# Patient Record
Sex: FEMALE | Race: WHITE | NOT HISPANIC OR LATINO | URBAN - METROPOLITAN AREA
[De-identification: names, ages, dates, MRNs, and addresses within clinical notes are randomized per-mention and may not be internally consistent; named-entity substitution may affect disease eponyms.]

---

## 2019-03-05 ENCOUNTER — OFFICE (OUTPATIENT)
Dept: URBAN - METROPOLITAN AREA CLINIC 101 | Facility: CLINIC | Age: 44
End: 2019-03-05

## 2019-03-05 VITALS
HEART RATE: 69 BPM | SYSTOLIC BLOOD PRESSURE: 113 MMHG | DIASTOLIC BLOOD PRESSURE: 72 MMHG | TEMPERATURE: 98.1 F | HEIGHT: 72 IN | WEIGHT: 187 LBS

## 2019-03-05 DIAGNOSIS — F41.9 ANXIETY DISORDER, UNSPECIFIED: ICD-10-CM

## 2019-03-05 DIAGNOSIS — G47.30 SLEEP APNEA, UNSPECIFIED: ICD-10-CM

## 2019-03-05 DIAGNOSIS — R25.2 CRAMP AND SPASM: ICD-10-CM

## 2019-03-05 DIAGNOSIS — R12 HEARTBURN: ICD-10-CM

## 2019-03-05 DIAGNOSIS — R14.2 ERUCTATION: ICD-10-CM

## 2019-03-05 DIAGNOSIS — K21.9 GASTRO-ESOPHAGEAL REFLUX DISEASE WITHOUT ESOPHAGITIS: ICD-10-CM

## 2019-03-05 DIAGNOSIS — R06.89 OTHER ABNORMALITIES OF BREATHING: ICD-10-CM

## 2019-03-05 DIAGNOSIS — R63.4 ABNORMAL WEIGHT LOSS: ICD-10-CM

## 2019-03-05 PROCEDURE — 99244 OFF/OP CNSLTJ NEW/EST MOD 40: CPT

## 2019-03-05 RX ORDER — PANTOPRAZOLE SODIUM 20 MG/1
TABLET, DELAYED RELEASE ORAL
Qty: 30 | Refills: 3 | Status: ACTIVE
Start: 2019-03-05

## 2019-03-05 NOTE — SERVICEHPINOTES
CHEN ANAYA   is a   44  female very anxious who complains of nausea (onset 11/2018) and reflux (onset 01/2019). She states nausea occurs first thing in the mornings (anxiety also worse in the mornings) and improves with food throughout the day. She recalls "nausea" started in 11/2018 with root canal when she had abxs along with ibuprofen 600 mg q 4 hours x 1 week. She reports daily episodes of intermittent, mid sternal "pressure/tightness," globus sensation, and epigastric discomfort (worse on empty stomach) that remains at baseline "mild" throughout the day. She eats dinner by 7 pm and sleeps by 10 30 pm.  She mentions taking Pantoprazole 40 mg qd in the morning (eats 1 hour later) x 2 months prescribed by PCP that has not helped. Rare NSAID use. She takes "[employer]" by [employer] probiotic x 1 month. She mentions 10 to 15 lbs weight loss since 11/2018 due to keto diet. She mentions switch from Celexa (mentions taken x 6 yrs) to Zoloft since 01/2019 that has not improved anxiety. She states no h/o cardiac or pulmonary disease. She has MARVA CPAP used at night. She notes "short of breath" while sitting/exercises secondary to anxiety No evidence of labored breathing or dyspnea at rest in exam room. Prior cardiac evaluation in 2018 including echocardiogram at [facility]. Denies cough, vomiting, dysphagia/odynophagia, decreased appetite, early satiety, bloating, diarrhea, melena, unintended weight loss.

## 2019-03-12 ENCOUNTER — OFFICE (OUTPATIENT)
Dept: URBAN - METROPOLITAN AREA PATHOLOGY 17 | Facility: PATHOLOGY | Age: 44
End: 2019-03-12

## 2019-03-12 ENCOUNTER — OFFICE (OUTPATIENT)
Dept: URBAN - METROPOLITAN AREA CLINIC 100 | Facility: CLINIC | Age: 44
End: 2019-03-12

## 2019-03-12 VITALS
DIASTOLIC BLOOD PRESSURE: 85 MMHG | HEART RATE: 65 BPM | RESPIRATION RATE: 16 BRPM | HEIGHT: 72 IN | DIASTOLIC BLOOD PRESSURE: 79 MMHG | TEMPERATURE: 98.4 F | HEART RATE: 74 BPM | TEMPERATURE: 98.1 F | SYSTOLIC BLOOD PRESSURE: 131 MMHG | SYSTOLIC BLOOD PRESSURE: 119 MMHG | SYSTOLIC BLOOD PRESSURE: 124 MMHG | HEART RATE: 60 BPM | SYSTOLIC BLOOD PRESSURE: 111 MMHG | WEIGHT: 187 LBS | HEART RATE: 64 BPM | SYSTOLIC BLOOD PRESSURE: 110 MMHG | OXYGEN SATURATION: 99 % | SYSTOLIC BLOOD PRESSURE: 129 MMHG | DIASTOLIC BLOOD PRESSURE: 76 MMHG | DIASTOLIC BLOOD PRESSURE: 71 MMHG | OXYGEN SATURATION: 96 % | DIASTOLIC BLOOD PRESSURE: 89 MMHG | SYSTOLIC BLOOD PRESSURE: 125 MMHG | OXYGEN SATURATION: 100 % | RESPIRATION RATE: 17 BRPM | HEART RATE: 62 BPM | DIASTOLIC BLOOD PRESSURE: 75 MMHG | RESPIRATION RATE: 15 BRPM | RESPIRATION RATE: 13 BRPM | HEART RATE: 59 BPM

## 2019-03-12 DIAGNOSIS — R12 HEARTBURN: ICD-10-CM

## 2019-03-12 DIAGNOSIS — R06.89 OTHER ABNORMALITIES OF BREATHING: ICD-10-CM

## 2019-03-12 DIAGNOSIS — K21.9 GASTRO-ESOPHAGEAL REFLUX DISEASE WITHOUT ESOPHAGITIS: ICD-10-CM

## 2019-03-12 DIAGNOSIS — F41.9 ANXIETY DISORDER, UNSPECIFIED: ICD-10-CM

## 2019-03-12 PROBLEM — K22.8 OTHER SPECIFIED DISEASES OF ESOPHAGUS: Status: ACTIVE | Noted: 2019-03-12

## 2019-03-12 PROCEDURE — 88305 TISSUE EXAM BY PATHOLOGIST: CPT

## 2019-03-12 PROCEDURE — 43239 EGD BIOPSY SINGLE/MULTIPLE: CPT

## 2019-03-12 PROCEDURE — 88313 SPECIAL STAINS GROUP 2: CPT

## 2019-03-12 PROCEDURE — 88312 SPECIAL STAINS GROUP 1: CPT
